# Patient Record
Sex: FEMALE | Race: WHITE
[De-identification: names, ages, dates, MRNs, and addresses within clinical notes are randomized per-mention and may not be internally consistent; named-entity substitution may affect disease eponyms.]

---

## 2018-01-30 ENCOUNTER — HOSPITAL ENCOUNTER (OUTPATIENT)
Dept: HOSPITAL 7 - FB.SDS | Age: 83
End: 2018-01-30
Attending: OPHTHALMOLOGY
Payer: MEDICARE

## 2018-01-30 DIAGNOSIS — I10: ICD-10-CM

## 2018-01-30 DIAGNOSIS — G25.9: ICD-10-CM

## 2018-01-30 DIAGNOSIS — M19.90: ICD-10-CM

## 2018-01-30 DIAGNOSIS — Z90.710: ICD-10-CM

## 2018-01-30 DIAGNOSIS — Z83.518: ICD-10-CM

## 2018-01-30 DIAGNOSIS — I48.2: ICD-10-CM

## 2018-01-30 DIAGNOSIS — H52.209: Primary | ICD-10-CM

## 2018-01-30 DIAGNOSIS — Z79.899: ICD-10-CM

## 2018-01-30 DIAGNOSIS — Z90.11: ICD-10-CM

## 2018-01-30 DIAGNOSIS — Z79.01: ICD-10-CM

## 2018-01-30 DIAGNOSIS — H52.4: ICD-10-CM

## 2018-01-30 DIAGNOSIS — G43.009: ICD-10-CM

## 2018-01-30 DIAGNOSIS — Z91.048: ICD-10-CM

## 2018-01-30 DIAGNOSIS — H25.9: ICD-10-CM

## 2018-01-30 DIAGNOSIS — K21.9: ICD-10-CM

## 2018-01-30 PROCEDURE — C1780 LENS, INTRAOCULAR (NEW TECH): HCPCS

## 2018-02-27 ENCOUNTER — HOSPITAL ENCOUNTER (OUTPATIENT)
Dept: HOSPITAL 7 - FB.SDS | Age: 83
Discharge: HOME | End: 2018-02-27
Attending: OPHTHALMOLOGY
Payer: MEDICARE

## 2018-02-27 DIAGNOSIS — I10: ICD-10-CM

## 2018-02-27 DIAGNOSIS — I48.2: ICD-10-CM

## 2018-02-27 DIAGNOSIS — Z79.01: ICD-10-CM

## 2018-02-27 DIAGNOSIS — H26.9: Primary | ICD-10-CM

## 2018-02-27 DIAGNOSIS — Z91.09: ICD-10-CM

## 2018-02-27 DIAGNOSIS — Z79.899: ICD-10-CM

## 2018-02-27 DIAGNOSIS — K21.9: ICD-10-CM

## 2018-02-27 PROCEDURE — 66984 XCAPSL CTRC RMVL W/O ECP: CPT

## 2018-02-27 PROCEDURE — C1780 LENS, INTRAOCULAR (NEW TECH): HCPCS

## 2018-02-27 NOTE — OR
DATE OF OPERATION:  02/27/2018

 

SURGEON:  Darius Gonzales MD

 

PREOPERATIVE DIAGNOSIS:  Cataract, left eye.

 

POSTOPERATIVE DIAGNOSIS:  Cataract, left eye.

 

OPERATION PERFORMED:  Phacoemulsification of cataract left eye with placement of

an Abbott, model ZCB00, 21.0 diopter, foldable, posterior chamber intraocular

lens.

 

ASSISTANT:  None.

 

DESCRIPTION OF PROCEDURE:  Peribulbar anesthetic was performed using a mixture

of 2% lidocaine with Wydase.  The patient was prepped and draped in the usual

fashion.  A 3 mm fornix based conjunctival flap was performed at the 1 o'clock

position.  Hemostasis was obtained using diathermy, and a 2.8 mm grooved near

clear corneal incision was then made.  A stab incision was made into the

anterior chamber at the 7 o'clock position and a second stab wound incision was

made underlying the grooved near clear corneal incision.  Viscoat was instilled

into the anterior chamber, and a continuous tear capsulotomy was performed.

Hydrodissection was accomplished with balanced salt solution, and the nucleus

was removed in a divide and conquer fashion.  The remaining cortical material

was removed with the irrigation and aspiration unit.  Viscoat was instilled into

the anterior chamber, and an Abbott, model ZCB00, 21.0 diopter foldable, 
posterior

chamber intraocular lens was placed into the capsular bag, the haptics being

positioned at the 1 and 7 o'clock positions.  The residual Viscoat was removed

from the anterior chamber and the anterior chamber reformed with balanced salt

solution.  The wound was checked and noted to be watertight.  The conjunctiva

was secured in its original position with diathermy.  Alphagan and Maxitrol

Ointment were then placed into the patient's eye.  The patient tolerated the

procedure well and it was without complication.  Elapsed phacoemulsification

time was 28.6 seconds.

 

Postoperative instructions as related to activities as well as medications were

reviewed with the patient.  The patient was instructed to return to see me on

the day following surgery for the first postoperative check.  The patient was

also instructed to contact me prior to that time if she were to have any

problems.

 

Job#: [id number]

DD: 02/27/2018 1526

DT: 02/27/2018 2059 DEG/MODL



CC:  KIM PELAYO CNP

[facility]

## 2023-09-09 ENCOUNTER — HOSPITAL ENCOUNTER (EMERGENCY)
Dept: HOSPITAL 7 - FB.ED | Age: 88
LOS: 1 days | Discharge: NURSING, REHAB OR LONG-TERM CARE | End: 2023-09-10
Payer: MEDICARE

## 2023-09-09 DIAGNOSIS — I48.20: ICD-10-CM

## 2023-09-09 DIAGNOSIS — I10: ICD-10-CM

## 2023-09-09 DIAGNOSIS — Z79.01: ICD-10-CM

## 2023-09-09 DIAGNOSIS — Z87.891: ICD-10-CM

## 2023-09-09 DIAGNOSIS — K56.609: Primary | ICD-10-CM

## 2023-09-09 DIAGNOSIS — Z79.899: ICD-10-CM

## 2023-09-09 DIAGNOSIS — Z91.048: ICD-10-CM

## 2023-09-09 DIAGNOSIS — K21.9: ICD-10-CM

## 2023-09-09 DIAGNOSIS — R79.1: ICD-10-CM

## 2023-09-09 LAB
ALBUMIN SERPL-MCNC: 4.2 G/DL (ref 2.9–4.5)
ALBUMIN/GLOB SERPL: 1.1 {RATIO}
ALP SERPL-CCNC: 62 IU/L (ref 56–112)
ALT SERPL-CCNC: 34 U/L (ref 12–36)
AST SERPL-CCNC: 27 IU/L (ref 5–25)
BASOPHILS # BLD AUTO: 0 X10-3/UL (ref 0–0.1)
BASOPHILS NFR BLD AUTO: 0.4 % (ref 0.2–1.5)
BILIRUB SERPL-MCNC: 1.1 MG/DL (ref 0.1–1.3)
BUN SERPL-MCNC: 16 MG/DL (ref 7–18)
BUN/CREAT SERPL: 20 (ref 9–20)
CALCIUM SERPL-MCNC: 9.8 MG/DL (ref 8.6–10.2)
CHLORIDE SERPL-SCNC: 99 MMOL/L (ref 100–110)
CO2 SERPL-SCNC: 26 MMOL/L (ref 21–32)
CREAT CL 24H UR+SERPL-VRATE: (no result) ML/MIN
CREAT SERPL-MCNC: 0.8 MG/DL (ref 0.55–1.02)
EOSINOPHIL # BLD AUTO: 0 X10-3/UL (ref 0–0.8)
EOSINOPHIL NFR BLD AUTO: 0.2 % (ref 0.6–8.1)
ERYTHROCYTE [DISTWIDTH] IN BLOOD BY AUTOMATED COUNT: 14.7 % (ref 12.3–16.5)
GLOBULIN SER-MCNC: 3.8 G/DL
GLUCOSE SERPL-MCNC: 148 MG/DL (ref 80–116)
HCT VFR BLD AUTO: 44.3 % (ref 34.2–48.2)
HGB BLD-MCNC: 15 G/DL (ref 11.4–15.5)
INR PPP: 1.85 (ref 1–1.24)
LYMPHOCYTES # BLD AUTO: 0.8 X10-3/UL (ref 1–4.4)
LYMPHOCYTES NFR BLD AUTO: 10.2 % (ref 18.4–52.1)
MCH RBC QN AUTO: 30.8 PG (ref 23.9–33.9)
MCHC RBC AUTO-ENTMCNC: 33.8 G/DL (ref 31.9–34.8)
MCHC RBC AUTO-ENTMCNC: 91 FL (ref 76.7–100.5)
MONOCYTES # BLD AUTO: 0.8 X10-3/UL (ref 0.3–1)
MONOCYTES NFR BLD AUTO: 11.2 % (ref 4.4–15.7)
NEUTROPHILS # BLD AUTO: 5.9 X10-3/UL (ref 1.5–6.3)
NEUTROPHILS NFR BLD AUTO: 78 % (ref 30.8–76.2)
PLATELET # BLD AUTO: 262 X10(3)UL (ref 151–488)
PMV BLD AUTO: 9.3 FL (ref 7.1–12.4)
POTASSIUM SERPL-SCNC: 3.7 MMOL/L (ref 3.5–5.3)
PROT SERPL-MCNC: 8 G/DL (ref 6–8)
PROTHROMBIN TIME: 18.7 SEC (ref 9–11.1)
RBC # BLD AUTO: 4.87 X10(6)UL (ref 3.6–5.2)
SODIUM SERPL-SCNC: 136 MMOL/L (ref 135–145)
WBC # BLD AUTO: 7.6 X10-3/UL (ref 3–10.3)

## 2023-09-09 PROCEDURE — 85610 PROTHROMBIN TIME: CPT

## 2023-09-09 PROCEDURE — 85025 COMPLETE CBC W/AUTO DIFF WBC: CPT

## 2023-09-09 PROCEDURE — 80053 COMPREHEN METABOLIC PANEL: CPT

## 2023-09-09 PROCEDURE — 93005 ELECTROCARDIOGRAM TRACING: CPT

## 2023-09-09 PROCEDURE — 83605 ASSAY OF LACTIC ACID: CPT

## 2023-09-09 PROCEDURE — 96374 THER/PROPH/DIAG INJ IV PUSH: CPT

## 2023-09-09 PROCEDURE — 99285 EMERGENCY DEPT VISIT HI MDM: CPT

## 2023-09-09 PROCEDURE — 71045 X-RAY EXAM CHEST 1 VIEW: CPT

## 2023-09-09 PROCEDURE — 74177 CT ABD & PELVIS W/CONTRAST: CPT

## 2023-09-09 PROCEDURE — 96375 TX/PRO/DX INJ NEW DRUG ADDON: CPT

## 2023-09-09 PROCEDURE — 36415 COLL VENOUS BLD VENIPUNCTURE: CPT

## 2023-09-09 PROCEDURE — 83615 LACTATE (LD) (LDH) ENZYME: CPT

## 2023-09-09 PROCEDURE — 82550 ASSAY OF CK (CPK): CPT

## 2023-09-09 PROCEDURE — 86140 C-REACTIVE PROTEIN: CPT

## 2023-09-09 PROCEDURE — 96361 HYDRATE IV INFUSION ADD-ON: CPT

## 2023-09-09 PROCEDURE — 43752 NASAL/OROGASTRIC W/TUBE PLMT: CPT

## 2023-09-10 LAB
ALBUMIN SERPL-MCNC: 3.5 G/DL (ref 2.9–4.5)
ALBUMIN/GLOB SERPL: 1.1 {RATIO}
ALP SERPL-CCNC: 48 IU/L (ref 56–112)
ALT SERPL-CCNC: 27 U/L (ref 12–36)
AST SERPL-CCNC: 23 IU/L (ref 5–25)
BASOPHILS # BLD AUTO: 0 X10-3/UL (ref 0–0.1)
BASOPHILS NFR BLD AUTO: 0.3 % (ref 0.2–1.5)
BILIRUB SERPL-MCNC: 0.8 MG/DL (ref 0.1–1.3)
BUN SERPL-MCNC: 13 MG/DL (ref 7–18)
BUN/CREAT SERPL: 16.3 (ref 9–20)
CALCIUM SERPL-MCNC: 8.5 MG/DL (ref 8.6–10.2)
CHLORIDE SERPL-SCNC: 103 MMOL/L (ref 100–110)
CK SERPL-CCNC: 73 IU/L (ref 60–160)
CO2 SERPL-SCNC: 30 MMOL/L (ref 21–32)
CREAT CL 24H UR+SERPL-VRATE: 42.9 ML/MIN
CREAT SERPL-MCNC: 0.8 MG/DL (ref 0.55–1.02)
EOSINOPHIL # BLD AUTO: 0 X10-3/UL (ref 0–0.8)
EOSINOPHIL NFR BLD AUTO: 0.1 % (ref 0.6–8.1)
ERYTHROCYTE [DISTWIDTH] IN BLOOD BY AUTOMATED COUNT: 14.6 % (ref 12.3–16.5)
GLOBULIN SER-MCNC: 3.2 G/DL
GLUCOSE SERPL-MCNC: 117 MG/DL (ref 80–116)
HCT VFR BLD AUTO: 39.9 % (ref 34.2–48.2)
HGB BLD-MCNC: 13.6 G/DL (ref 11.4–15.5)
LACTATE SERPL-SCNC: 0.8 MMOL/L (ref 0.4–2)
LDH SERPL-CCNC: 221 U/L (ref 81–234)
LYMPHOCYTES # BLD AUTO: 0.8 X10-3/UL (ref 1–4.4)
LYMPHOCYTES NFR BLD AUTO: 10.5 % (ref 18.4–52.1)
MCH RBC QN AUTO: 31.2 PG (ref 23.9–33.9)
MCHC RBC AUTO-ENTMCNC: 34.2 G/DL (ref 31.9–34.8)
MCHC RBC AUTO-ENTMCNC: 91.4 FL (ref 76.7–100.5)
MONOCYTES # BLD AUTO: 1 X10-3/UL (ref 0.3–1)
MONOCYTES NFR BLD AUTO: 13.3 % (ref 4.4–15.7)
NEUTROPHILS # BLD AUTO: 5.4 X10-3/UL (ref 1.5–6.3)
NEUTROPHILS NFR BLD AUTO: 75.8 % (ref 30.8–76.2)
PLATELET # BLD AUTO: 223 X10(3)UL (ref 151–488)
PMV BLD AUTO: 8.7 FL (ref 7.1–12.4)
POTASSIUM SERPL-SCNC: 3.5 MMOL/L (ref 3.5–5.3)
PROT SERPL-MCNC: 6.7 G/DL (ref 6–8)
RBC # BLD AUTO: 4.37 X10(6)UL (ref 3.6–5.2)
SODIUM SERPL-SCNC: 141 MMOL/L (ref 135–145)
WBC # BLD AUTO: 7.2 X10-3/UL (ref 3–10.3)